# Patient Record
Sex: MALE | Race: BLACK OR AFRICAN AMERICAN | NOT HISPANIC OR LATINO
[De-identification: names, ages, dates, MRNs, and addresses within clinical notes are randomized per-mention and may not be internally consistent; named-entity substitution may affect disease eponyms.]

---

## 2018-03-05 PROBLEM — Z00.00 ENCOUNTER FOR PREVENTIVE HEALTH EXAMINATION: Status: ACTIVE | Noted: 2018-03-05

## 2018-03-09 ENCOUNTER — APPOINTMENT (OUTPATIENT)
Dept: UROLOGY | Facility: CLINIC | Age: 38
End: 2018-03-09

## 2018-03-19 ENCOUNTER — APPOINTMENT (OUTPATIENT)
Dept: UROLOGY | Facility: CLINIC | Age: 38
End: 2018-03-19

## 2021-09-22 ENCOUNTER — EMERGENCY (EMERGENCY)
Facility: HOSPITAL | Age: 41
LOS: 0 days | Discharge: HOME | End: 2021-09-22
Attending: EMERGENCY MEDICINE | Admitting: EMERGENCY MEDICINE
Payer: COMMERCIAL

## 2021-09-22 VITALS
HEIGHT: 69 IN | DIASTOLIC BLOOD PRESSURE: 85 MMHG | TEMPERATURE: 98 F | RESPIRATION RATE: 19 BRPM | SYSTOLIC BLOOD PRESSURE: 121 MMHG | WEIGHT: 205.03 LBS | HEART RATE: 84 BPM | OXYGEN SATURATION: 98 %
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
## 2021-09-22 DIAGNOSIS — Z86.69 PERSONAL HISTORY OF OTHER DISEASES OF THE NERVOUS SYSTEM AND SENSE ORGANS: ICD-10-CM

## 2021-09-22 DIAGNOSIS — S39.012A STRAIN OF MUSCLE, FASCIA AND TENDON OF LOWER BACK, INITIAL ENCOUNTER: ICD-10-CM
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
## 2021-09-22 DIAGNOSIS — M54.5 LOW BACK PAIN: ICD-10-CM

## 2021-09-22 PROCEDURE — 99284 EMERGENCY DEPT VISIT MOD MDM: CPT

## 2021-09-22 RX ORDER — METHOCARBAMOL 500 MG/1
1000 TABLET, FILM COATED ORAL ONCE
Refills: 0 | Status: COMPLETED | OUTPATIENT
Start: 2021-09-22 | End: 2021-09-22

## 2021-09-22 RX ORDER — METHOCARBAMOL 500 MG/1
2 TABLET, FILM COATED ORAL
Qty: 32 | Refills: 0
Start: 2021-09-22 | End: 2021-09-25

## 2021-09-22 RX ORDER — DEXAMETHASONE 0.5 MG/5ML
10 ELIXIR ORAL ONCE
Refills: 0 | Status: COMPLETED | OUTPATIENT
Start: 2021-09-22 | End: 2021-09-22

## 2021-09-22 RX ORDER — KETOROLAC TROMETHAMINE 30 MG/ML
30 SYRINGE (ML) INJECTION ONCE
Refills: 0 | Status: DISCONTINUED | OUTPATIENT
Start: 2021-09-22 | End: 2021-09-22

## 2021-09-22 RX ADMIN — Medication 30 MILLIGRAM(S): at 22:25

## 2021-09-22 RX ADMIN — METHOCARBAMOL 1000 MILLIGRAM(S): 500 TABLET, FILM COATED ORAL at 22:26

## 2021-09-22 RX ADMIN — Medication 10 MILLIGRAM(S): at 22:25

## 2021-09-22 NOTE — ED PROVIDER NOTE - NS ED ROS FT
Review of Systems    Constitutional: (-) fever  Cardiovascular: (-) chest pain, (-) syncope  Respiratory: (-) cough, (-) shortness of breath  Gastrointestinal: (-) vomiting, (-) diarrhea, (-) abdominal pain  Musculoskeletal: As per HPI  Integumentary: (-) rash, (-) edema  Neurological: (+)hx of headache, (-) altered mental status    Except as documented in the HPI, all other systems are negative.

## 2021-09-22 NOTE — ED ADULT NURSE NOTE - OBJECTIVE STATEMENT
pt reports lower back pain starting a few days ago after lifting a heavy object. OTC medications taken with no relief.

## 2021-09-22 NOTE — ED PROVIDER NOTE - CLINICAL SUMMARY MEDICAL DECISION MAKING FREE TEXT BOX
Pt with 1 day of b/l mid back pain after lifting heavy objects. Required meds. Will dc with outpt f/up.

## 2021-09-22 NOTE — ED PROVIDER NOTE - PHYSICAL EXAMINATION
CONSTITUTIONAL: Well-developed; well-nourished; in no acute distress, nontoxic appearing  SKIN: skin exam is warm and dry,  HEAD: Normocephalic; atraumatic.  NECK: Supple; non tender.+ full passive ROM in all directions. No JVD. No midline ttp  CARD: S1, S2 normal, no murmur  RESP: No wheezes, rales or rhonchi. Good air movement bilaterally  ABD: soft; non-distended; non-tender. No Rebound, No guarding  BACK: no midline ttp, + b/l mid paraspinal back pain, no palpable muscle spasms  EXT: Normal ROM. No cyanosis or edema. Dp Pulses intact.   NEURO: awake, alert, following commands, oriented, grossly unremarkable. No Focal deficits. GCS 15.   PSYCH: Cooperative, appropriate.

## 2021-09-22 NOTE — ED PROVIDER NOTE - PATIENT PORTAL LINK FT
You can access the FollowMyHealth Patient Portal offered by [facility] by registering at the following website: [email or web address]. By joining [employer]’s FollowMyHealth portal, you will also be able to view your health information using other applications (apps) compatible with our system.

## 2021-09-22 NOTE — ED PROVIDER NOTE - NSFOLLOWUPINSTRUCTIONS_ED_ALL_ED_FT
Muscle Strain      A muscle strain is an injury that happens when a muscle is stretched longer than normal. This can happen during a fall, sports, or lifting. This can tear some muscle fibers. Usually, recovery from muscle strain takes 1–2 weeks. Complete healing normally takes 5–6 weeks.  This condition is first treated with PRICE therapy. This involves:  •Protecting your muscle from being injured again.      •Resting your injured muscle.      •Icing your injured muscle.      •Applying pressure (compression) to your injured muscle. This may be done with a splint or elastic bandage.      •Raising (elevating) your injured muscle.      Your doctor may also recommend medicine for pain.      Follow these instructions at home:    If you have a splint:     •Wear the splint as told by your doctor. Take it off only as told by your doctor.      •Loosen the splint if your fingers or toes tingle, get numb, or turn cold and blue.      •Keep the splint clean.    •If the splint is not waterproof:  •Do not let it get wet.      •Cover it with a watertight covering when you take a bath or a shower.          Managing pain, stiffness, and swelling    •If told, put ice on your injured area.  •If you have a removable splint, take it off as told by your doctor.      •Put ice in a plastic bag.      •Place a towel between your skin and the bag.      •Leave the ice on for 20 minutes, 2–3 times a day.        •Move your fingers or toes often. This helps to avoid stiffness and lessen swelling.      •Raise your injured area above the level of your heart while you are sitting or lying down.      •Wear an elastic bandage as told by your doctor. Make sure it is not too tight.      General instructions     •Take over-the-counter and prescription medicines only as told by your doctor. This may include medicines for pain and swelling that are taken by mouth or put on the skin, prescription pain medicine, or muscle relaxants.      •Limit your activity. Rest your injured muscle as told by your doctor. Your doctor may say that gentle movements are okay.      •If physical therapy was prescribed, do exercises as told by your doctor.      • Do not put pressure on any part of the splint until it is fully hardened. This may take many hours.      • Do not use any products that contain nicotine or tobacco, such as cigarettes and e-cigarettes. These can delay bone healing. If you need help quitting, ask your doctor.      •Warm up before you exercise. This helps to prevent more muscle strains.      •Ask your doctor when it is safe to drive if you have a splint.      •Keep all follow-up visits as told by your doctor. This is important.        Contact a doctor if:    •You have more pain or swelling in your injured area.        Get help right away if:  •You have any of these problems in your injured area:  •You have numbness.      •You have tingling.      •You lose a lot of strength.          Summary    •A muscle strain is an injury that happens when a muscle is stretched longer than normal.      •This condition is first treated with PRICE therapy. This includes protecting, resting, icing, adding pressure, and raising your injury.      •Limit your activity. Rest your injured muscle as told by your doctor. Your doctor may say that gentle movements are okay.      •Warm up before you exercise. This helps to prevent more muscle strains.      This information is not intended to replace advice given to you by your health care provider. Make sure you discuss any questions you have with your health care provider.

## 2021-09-22 NOTE — ED PROVIDER NOTE - NSFOLLOWUPCLINICS_GEN_ALL_ED_FT
[facility] ([facility])  Rehabilitation  [facility] [address] [place] [address]  Phone: [phone]  Fax:   Follow Up Time: 7-10 Days

## 2021-09-22 NOTE — ED PROVIDER NOTE - OBJECTIVE STATEMENT
40 M with hx of migraine headaches with complaints of 1 day of non radiating mid back pain. Reports heavy lifting and lots of twisting motion while at work the day before. Denie any falls or direct trauma. No LE pain, weakness, urinary retention or incontinence. No fecal incontinence.

## 2023-06-13 ENCOUNTER — APPOINTMENT (OUTPATIENT)
Dept: CARDIOLOGY | Facility: CLINIC | Age: 43
End: 2023-06-13
Payer: COMMERCIAL

## 2023-06-13 VITALS
DIASTOLIC BLOOD PRESSURE: 90 MMHG | WEIGHT: 208 LBS | HEIGHT: 69 IN | HEART RATE: 71 BPM | BODY MASS INDEX: 30.81 KG/M2 | SYSTOLIC BLOOD PRESSURE: 138 MMHG

## 2023-06-13 DIAGNOSIS — Z78.9 OTHER SPECIFIED HEALTH STATUS: ICD-10-CM

## 2023-06-13 DIAGNOSIS — F17.290 NICOTINE DEPENDENCE, OTHER TOBACCO PRODUCT, UNCOMPLICATED: ICD-10-CM

## 2023-06-13 PROCEDURE — 93306 TTE W/DOPPLER COMPLETE: CPT

## 2023-06-13 PROCEDURE — 99204 OFFICE O/P NEW MOD 45 MIN: CPT | Mod: 25

## 2023-06-13 PROCEDURE — 93000 ELECTROCARDIOGRAM COMPLETE: CPT | Mod: 59

## 2023-06-13 PROCEDURE — 93242 EXT ECG>48HR<7D RECORDING: CPT

## 2023-06-13 NOTE — ASSESSMENT
[FreeTextEntry1] : Mr. Mike is a 42-year-old man with history of dyslipidemia presenting for evaluation of palpitations.\par \par Impression:\par (1) Palpitations\par (2) Abnormal ECG with left axis deviation\par (3) Dyslipidemia, ASCVD <5%\par (4) Family history of premature ASCVD (brother with CVA in his 40s)\par \par Plan:\par - 7 day event monitor\par - TTE\par - Discussed utility of stress testing. No exertional complaints at this time. Will defer for the time being and reassess on follow up.\par - Check lp(a) and hsCRP\par \par RTC after the above workup

## 2023-06-13 NOTE — HISTORY OF PRESENT ILLNESS
[FreeTextEntry1] : Mr. Mike is a 42-year-old man with history of dyslipidemia presenting for evaluation of palpitations.\par \par Patient reports generally being in good health. He works in construction mostly controlling vehicles but denies any exertional complaints such as dyspnea or chest discomfort. Over the past year or so he noticed intermittent palpitations lasting up to a few seconds at a time with associated chest discomfort. Symptoms occur about once a week without associated provoking factors.\par \par His brother had a CVA in his 40s.\par He uses a nicotine vape.\par \par ECG shows NSR, left axis, normal intervals, no ischemic changes\par \par Labs:\par - , , TG 94, HDL 58, non-\par - Cr 1.24, BUN 12, K 4.4\par - TSH 1.78, D-25 OH 29, A1c 5.4%

## 2023-06-14 LAB — CRP SERPL HS-MCNC: 1.47 MG/L

## 2023-06-15 LAB — APO LP(A) SERPL-MCNC: 21.2 NMOL/L

## 2023-07-10 ENCOUNTER — APPOINTMENT (OUTPATIENT)
Dept: CARDIOLOGY | Facility: CLINIC | Age: 43
End: 2023-07-10
Payer: COMMERCIAL

## 2023-07-10 DIAGNOSIS — E78.5 HYPERLIPIDEMIA, UNSPECIFIED: ICD-10-CM

## 2023-07-10 DIAGNOSIS — R00.2 PALPITATIONS: ICD-10-CM

## 2023-07-10 PROCEDURE — 99441: CPT

## 2023-07-11 PROBLEM — R00.2 PALPITATIONS: Status: ACTIVE | Noted: 2023-06-13

## 2023-07-11 PROBLEM — E78.5 DYSLIPIDEMIA: Status: ACTIVE | Noted: 2023-06-13

## 2024-12-19 ENCOUNTER — EMERGENCY (EMERGENCY)
Facility: HOSPITAL | Age: 44
LOS: 0 days | Discharge: HOME | End: 2024-12-20
Attending: STUDENT IN AN ORGANIZED HEALTH CARE EDUCATION/TRAINING PROGRAM
Payer: COMMERCIAL

## 2024-12-19 VITALS
WEIGHT: 217.82 LBS | SYSTOLIC BLOOD PRESSURE: 129 MMHG | HEART RATE: 72 BPM | HEIGHT: 69 IN | RESPIRATION RATE: 18 BRPM | TEMPERATURE: 98 F | DIASTOLIC BLOOD PRESSURE: 80 MMHG | OXYGEN SATURATION: 98 %

## 2024-12-19 PROCEDURE — 99285 EMERGENCY DEPT VISIT HI MDM: CPT

## 2024-12-19 PROCEDURE — 85025 COMPLETE CBC W/AUTO DIFF WBC: CPT

## 2024-12-19 PROCEDURE — 74177 CT ABD & PELVIS W/CONTRAST: CPT | Mod: MC

## 2024-12-19 PROCEDURE — 99284 EMERGENCY DEPT VISIT MOD MDM: CPT | Mod: 25

## 2024-12-19 PROCEDURE — 36415 COLL VENOUS BLD VENIPUNCTURE: CPT

## 2024-12-19 PROCEDURE — 80053 COMPREHEN METABOLIC PANEL: CPT

## 2024-12-19 PROCEDURE — 81001 URINALYSIS AUTO W/SCOPE: CPT

## 2024-12-19 PROCEDURE — 83605 ASSAY OF LACTIC ACID: CPT

## 2024-12-19 RX ORDER — METHOCARBAMOL 500 MG/1
1500 TABLET, FILM COATED ORAL ONCE
Refills: 0 | Status: COMPLETED | OUTPATIENT
Start: 2024-12-19 | End: 2024-12-19

## 2024-12-19 RX ORDER — IBUPROFEN 200 MG
600 TABLET ORAL ONCE
Refills: 0 | Status: COMPLETED | OUTPATIENT
Start: 2024-12-19 | End: 2024-12-19

## 2024-12-19 RX ADMIN — METHOCARBAMOL 1500 MILLIGRAM(S): 500 TABLET, FILM COATED ORAL at 23:01

## 2024-12-19 RX ADMIN — Medication 600 MILLIGRAM(S): at 22:44

## 2024-12-19 NOTE — ED ADULT TRIAGE NOTE - CHIEF COMPLAINT QUOTE
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

## 2024-12-19 NOTE — ED ADULT NURSE NOTE - CHIEF COMPLAINT QUOTE
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

## 2024-12-20 LAB
ALBUMIN SERPL ELPH-MCNC: 4.3 G/DL — SIGNIFICANT CHANGE UP (ref 3.5–5.2)
ALP SERPL-CCNC: 76 U/L — SIGNIFICANT CHANGE UP (ref 30–115)
ALT FLD-CCNC: 45 U/L — HIGH (ref 0–41)
ANION GAP SERPL CALC-SCNC: 10 MMOL/L — SIGNIFICANT CHANGE UP (ref 7–14)
APPEARANCE UR: CLEAR — SIGNIFICANT CHANGE UP
AST SERPL-CCNC: 30 U/L — SIGNIFICANT CHANGE UP (ref 0–41)
BACTERIA # UR AUTO: NEGATIVE /HPF — SIGNIFICANT CHANGE UP
BASOPHILS # BLD AUTO: 0.04 K/UL — SIGNIFICANT CHANGE UP (ref 0–0.2)
BASOPHILS NFR BLD AUTO: 0.8 % — SIGNIFICANT CHANGE UP (ref 0–1)
BILIRUB SERPL-MCNC: 0.3 MG/DL — SIGNIFICANT CHANGE UP (ref 0.2–1.2)
BILIRUB UR-MCNC: NEGATIVE — SIGNIFICANT CHANGE UP
BUN SERPL-MCNC: 15 MG/DL — SIGNIFICANT CHANGE UP (ref 10–20)
CALCIUM SERPL-MCNC: 9.2 MG/DL — SIGNIFICANT CHANGE UP (ref 8.4–10.5)
CAST: 0 /LPF — SIGNIFICANT CHANGE UP (ref 0–4)
CHLORIDE SERPL-SCNC: 101 MMOL/L — SIGNIFICANT CHANGE UP (ref 98–110)
CO2 SERPL-SCNC: 27 MMOL/L — SIGNIFICANT CHANGE UP (ref 17–32)
COLOR SPEC: YELLOW — SIGNIFICANT CHANGE UP
CREAT SERPL-MCNC: 1.3 MG/DL — SIGNIFICANT CHANGE UP (ref 0.7–1.5)
DIFF PNL FLD: NEGATIVE — SIGNIFICANT CHANGE UP
EGFR: 69 ML/MIN/1.73M2 — SIGNIFICANT CHANGE UP
EOSINOPHIL # BLD AUTO: 0.36 K/UL — SIGNIFICANT CHANGE UP (ref 0–0.7)
EOSINOPHIL NFR BLD AUTO: 7.2 % — SIGNIFICANT CHANGE UP (ref 0–8)
GLUCOSE SERPL-MCNC: 101 MG/DL — HIGH (ref 70–99)
GLUCOSE UR QL: NEGATIVE MG/DL — SIGNIFICANT CHANGE UP
HCT VFR BLD CALC: 43.3 % — SIGNIFICANT CHANGE UP (ref 42–52)
HGB BLD-MCNC: 14.7 G/DL — SIGNIFICANT CHANGE UP (ref 14–18)
IMM GRANULOCYTES NFR BLD AUTO: 0 % — LOW (ref 0.1–0.3)
KETONES UR-MCNC: NEGATIVE MG/DL — SIGNIFICANT CHANGE UP
LACTATE SERPL-SCNC: 0.9 MMOL/L — SIGNIFICANT CHANGE UP (ref 0.7–2)
LEUKOCYTE ESTERASE UR-ACNC: NEGATIVE — SIGNIFICANT CHANGE UP
LYMPHOCYTES # BLD AUTO: 1.86 K/UL — SIGNIFICANT CHANGE UP (ref 1.2–3.4)
LYMPHOCYTES # BLD AUTO: 37 % — SIGNIFICANT CHANGE UP (ref 20.5–51.1)
MCHC RBC-ENTMCNC: 31.7 PG — HIGH (ref 27–31)
MCHC RBC-ENTMCNC: 33.9 G/DL — SIGNIFICANT CHANGE UP (ref 32–37)
MCV RBC AUTO: 93.5 FL — SIGNIFICANT CHANGE UP (ref 80–94)
MONOCYTES # BLD AUTO: 0.54 K/UL — SIGNIFICANT CHANGE UP (ref 0.1–0.6)
MONOCYTES NFR BLD AUTO: 10.7 % — HIGH (ref 1.7–9.3)
NEUTROPHILS # BLD AUTO: 2.23 K/UL — SIGNIFICANT CHANGE UP (ref 1.4–6.5)
NEUTROPHILS NFR BLD AUTO: 44.3 % — SIGNIFICANT CHANGE UP (ref 42.2–75.2)
NITRITE UR-MCNC: NEGATIVE — SIGNIFICANT CHANGE UP
NRBC # BLD: 0 /100 WBCS — SIGNIFICANT CHANGE UP (ref 0–0)
PH UR: 5.5 — SIGNIFICANT CHANGE UP (ref 5–8)
PLATELET # BLD AUTO: 235 K/UL — SIGNIFICANT CHANGE UP (ref 130–400)
PMV BLD: 10.3 FL — SIGNIFICANT CHANGE UP (ref 7.4–10.4)
POTASSIUM SERPL-MCNC: 4.3 MMOL/L — SIGNIFICANT CHANGE UP (ref 3.5–5)
POTASSIUM SERPL-SCNC: 4.3 MMOL/L — SIGNIFICANT CHANGE UP (ref 3.5–5)
PROT SERPL-MCNC: 8 G/DL — SIGNIFICANT CHANGE UP (ref 6–8)
PROT UR-MCNC: 100 MG/DL
RBC # BLD: 4.63 M/UL — LOW (ref 4.7–6.1)
RBC # FLD: 13.3 % — SIGNIFICANT CHANGE UP (ref 11.5–14.5)
RBC CASTS # UR COMP ASSIST: 0 /HPF — SIGNIFICANT CHANGE UP (ref 0–4)
SODIUM SERPL-SCNC: 138 MMOL/L — SIGNIFICANT CHANGE UP (ref 135–146)
SP GR SPEC: >1.03 — HIGH (ref 1–1.03)
SQUAMOUS # UR AUTO: 0 /HPF — SIGNIFICANT CHANGE UP (ref 0–5)
UROBILINOGEN FLD QL: 0.2 MG/DL — SIGNIFICANT CHANGE UP (ref 0.2–1)
WBC # BLD: 5.03 K/UL — SIGNIFICANT CHANGE UP (ref 4.8–10.8)
WBC # FLD AUTO: 5.03 K/UL — SIGNIFICANT CHANGE UP (ref 4.8–10.8)
WBC UR QL: 1 /HPF — SIGNIFICANT CHANGE UP (ref 0–5)

## 2024-12-20 PROCEDURE — 74177 CT ABD & PELVIS W/CONTRAST: CPT | Mod: 26,MC

## 2024-12-20 NOTE — ED PROVIDER NOTE - NSFOLLOWUPINSTRUCTIONS_ED_ALL_ED_FT
Our Emergency Department Referral Coordinators will be reaching out to you in the next 24-48 hours from 9:00am to 5:00pm with a follow up appointment. Please expect a phone call from the hospital in that time frame. If you do not receive a call or if you have any questions or concerns, you can reach them at [phone]      Musculoskeletal Pain    AMBULATORY CARE:    Musculoskeletal pain can occur in muscles, bones, joints, ligaments, tendons, or nerves. The pain can be dull, achy, or sharp. You may have pain and tenderness to the touch as well. The pain can occur anywhere in your body. Musculoskeletal pain can be from an injury, surgery, or a medical condition such as polymyositis.    Seek care immediately if:    You have severe pain when you move the area.    You lose feeling in the area.    You have new or worse pain or swelling in the area. Your skin may feel tight.  Call your doctor if:    You have a fever.    You have pain that does not get better with treatment.    You have trouble sleeping because of your pain.    Your painful area becomes more tender, red, and warm to the touch.    You have less movement of the painful area.    You have questions or concerns about your condition or care.  Treatment may include any of the following:    NSAIDs help decrease swelling and pain or fever. This medicine is available with or without a doctor's order. NSAIDs can cause stomach bleeding or kidney problems in certain people. If you take blood thinner medicine, always ask your healthcare provider if NSAIDs are safe for you. Always read the medicine label and follow directions.    Acetaminophen decreases pain and fever. It is available without a doctor's order. Ask how much to take and how often to take it. Follow directions. Read the labels of all other medicines you are using to see if they also contain acetaminophen, or ask your doctor or pharmacist. Acetaminophen can cause liver damage if not taken correctly.    Muscle relaxers help relax your muscles to decrease pain and muscle spasms.    Steroids may be given to decrease redness, pain, and swelling.    A pain cream, gel, or patch may be applied to your skin on painful areas.  Self-care:    Rest as directed. Avoid activity that causes pain. You may be able to return to normal activity when you can move without pain. Follow directions for rest and activity.    Ice the painful area to decrease pain and swelling. Use an ice pack, or put ice in a plastic bag and cover it with a towel. Always put a cloth between the ice and your skin. Apply the ice as often as directed for the first 24 to 48 hours.    Apply heat to the area as directed. Heat helps decrease pain and muscle spasms. Your healthcare provider will tell you when and how often to apply heat.    Apply compression to the area, if directed. Your provider may want you to use a splint, brace, or elastic bandage. Compression helps decrease pain and swelling. A splint, brace, or bandage will also help protect the painful area when you move around.  How to Wrap an Elastic Bandage      Elevate (raise) the painful area to reduce swelling and pain. Use pillows, blankets, or rolled towels to elevate the area above the level of your heart. Elevate the area as often as you can.    Elevate Leg      Ask your healthcare provider if alternative therapies are right for you. Examples include acupuncture, relaxation, and massage. These therapies may help reduce pain.  Follow up with your doctor as directed: You may need more tests to help healthcare providers find the cause of your muscle pain. You may need physical therapy to learn muscle strengthening exercises. Write down your questions so you remember to

## 2024-12-20 NOTE — ED PROVIDER NOTE - PHYSICAL EXAMINATION
Constitutional: Well developed, well nourished, no acute distress  Head: Normocephalic, Atraumatic  Eyes: PERRLA, EOMI, conjunctiva and sclera WNL  ENT: Moist mucous membranes, no rhinorrhea, TM clear B/L  Neck: Supple, Nontender,    Respiratory: Normal chest excursion with respiration; Breath sounds clear and equal B/L; No wheezes, rales, or rhonchi   Cardiovascular: RRR; Normal S1, S2; No murmurs, rubs or gallops   ABD/GI:   Nondistended; Lower abdominal/pelvic tenderness no palpable masses or hernias; No guarding, rigidity or rebound;   EXT/MS: Moving all extremities; Distal pulses 2+ B/L; No midline tenderness or step-offs  Skin: Normal for age and race; Warm and dry; No rash  Neurologic: AAO x 4;  Normal motor and sensory function  Psychiatric: Appropriate affect, normal mood

## 2024-12-20 NOTE — ED PROVIDER NOTE - CLINICAL SUMMARY MEDICAL DECISION MAKING FREE TEXT BOX
44-year-old male with history of hyperlipidemia presenting today lower abdominal pain.  Patient states that while he was sitting in his truck he developed gradual onset lower abdominal pain, denies any fevers vomiting or diarrhea.  Denies any testicular pain or dysuria.  States that he was trying to get's to his truck car does not remember any direct trauma.  On exam well-appearing no acute distress, tenderness noted to the lower abdomen especially in the right lower quadrant, no evidence of hernia, no ecchymosis.  No CVA tenderness.  Labs and imaging unremarkable.  Pain improved in the ED with appropriate medication.  At this time stable for discharge. return precautions discussed.

## 2024-12-20 NOTE — ED PROVIDER NOTE - OBJECTIVE STATEMENT
44-year-old male with past ministry of hyperlipidemia presenting with bilateral lung/lower abdominal pain.  Patient reports he was trying to his truck on his lower abdomen/groin.  Patient denies any other trauma.  Patient not taking any for his pain.  Patient denies any weakness or numbness in his lower extremities.  No fevers, chest pain or shortness of breath, nausea, vomiting, diarrhea constipation,  symptoms. 44-year-old male with past ministry of hyperlipidemia presenting with bilateral groin/lower abdominal pain.  Patient reports he was trying to his truck on his lower abdomen/groin.  Patient denies any other trauma.  Patient not taking any for his pain.  Patient denies any weakness or numbness in his lower extremities.  No fevers, chest pain or shortness of breath, nausea, vomiting, diarrhea constipation,  symptoms.

## 2024-12-20 NOTE — ED PROVIDER NOTE - PATIENT PORTAL LINK FT
You can access the FollowMyHealth Patient Portal offered by [facility] by registering at the following website: [email or web address]. By joining [employer]’s FollowMyHealth portal, you will also be able to view your health information using other applications (apps) compatible with our system.

## 2024-12-23 NOTE — CHART NOTE - NSCHARTNOTEFT_GEN_A_CORE
Appt scheduled 01/06/2025 01:00 PM w/ Dr. Cruz @ [address], 3rd floor (general surgery referral).

## 2025-01-13 ENCOUNTER — APPOINTMENT (OUTPATIENT)
Dept: SURGERY | Facility: CLINIC | Age: 45
End: 2025-01-13

## 2025-01-22 ENCOUNTER — APPOINTMENT (OUTPATIENT)
Dept: UROLOGY | Facility: CLINIC | Age: 45
End: 2025-01-22
Payer: COMMERCIAL

## 2025-01-22 VITALS
SYSTOLIC BLOOD PRESSURE: 120 MMHG | BODY MASS INDEX: 31.4 KG/M2 | HEIGHT: 69 IN | DIASTOLIC BLOOD PRESSURE: 83 MMHG | HEART RATE: 74 BPM | RESPIRATION RATE: 18 BRPM | WEIGHT: 212 LBS | OXYGEN SATURATION: 96 %

## 2025-01-22 PROBLEM — Z12.5 PROSTATE CANCER SCREENING: Status: ACTIVE | Noted: 2025-01-22

## 2025-01-22 PROCEDURE — 99203 OFFICE O/P NEW LOW 30 MIN: CPT

## 2025-01-23 LAB
PSA FREE FLD-MCNC: 30 %
PSA FREE SERPL-MCNC: 0.39 NG/ML
PSA SERPL-MCNC: 1.27 NG/ML

## 2025-01-30 ENCOUNTER — APPOINTMENT (OUTPATIENT)
Dept: UROLOGY | Facility: CLINIC | Age: 45
End: 2025-01-30
Payer: COMMERCIAL

## 2025-01-30 DIAGNOSIS — Z12.5 ENCOUNTER FOR SCREENING FOR MALIGNANT NEOPLASM OF PROSTATE: ICD-10-CM

## 2025-01-30 PROCEDURE — 99212 OFFICE O/P EST SF 10 MIN: CPT

## 2025-07-29 ENCOUNTER — APPOINTMENT (OUTPATIENT)
Dept: CARDIOLOGY | Facility: CLINIC | Age: 45
End: 2025-07-29
Payer: COMMERCIAL

## 2025-07-29 VITALS
BODY MASS INDEX: 31.1 KG/M2 | HEART RATE: 72 BPM | DIASTOLIC BLOOD PRESSURE: 80 MMHG | HEIGHT: 69 IN | WEIGHT: 210 LBS | SYSTOLIC BLOOD PRESSURE: 120 MMHG

## 2025-07-29 DIAGNOSIS — R07.89 OTHER CHEST PAIN: ICD-10-CM

## 2025-07-29 DIAGNOSIS — E78.5 HYPERLIPIDEMIA, UNSPECIFIED: ICD-10-CM

## 2025-07-29 PROCEDURE — 93000 ELECTROCARDIOGRAM COMPLETE: CPT

## 2025-07-29 PROCEDURE — 99214 OFFICE O/P EST MOD 30 MIN: CPT | Mod: 25

## 2025-08-27 ENCOUNTER — RESULT REVIEW (OUTPATIENT)
Age: 45
End: 2025-08-27

## 2025-08-27 ENCOUNTER — OUTPATIENT (OUTPATIENT)
Dept: OUTPATIENT SERVICES | Facility: HOSPITAL | Age: 45
LOS: 1 days | End: 2025-08-27
Payer: COMMERCIAL

## 2025-08-27 DIAGNOSIS — R07.89 OTHER CHEST PAIN: ICD-10-CM

## 2025-08-27 DIAGNOSIS — Z00.8 ENCOUNTER FOR OTHER GENERAL EXAMINATION: ICD-10-CM

## 2025-08-27 PROCEDURE — 75574 CT ANGIO HRT W/3D IMAGE: CPT

## 2025-08-27 PROCEDURE — 75574 CT ANGIO HRT W/3D IMAGE: CPT | Mod: 26

## 2025-08-28 DIAGNOSIS — R07.89 OTHER CHEST PAIN: ICD-10-CM

## 2025-09-15 ENCOUNTER — APPOINTMENT (OUTPATIENT)
Dept: CARDIOLOGY | Facility: CLINIC | Age: 45
End: 2025-09-15